# Patient Record
Sex: FEMALE | Race: ASIAN | NOT HISPANIC OR LATINO | Employment: FULL TIME | URBAN - METROPOLITAN AREA
[De-identification: names, ages, dates, MRNs, and addresses within clinical notes are randomized per-mention and may not be internally consistent; named-entity substitution may affect disease eponyms.]

---

## 2017-03-21 ENCOUNTER — COMMUNICATION - HEALTHEAST (OUTPATIENT)
Dept: FAMILY MEDICINE | Facility: CLINIC | Age: 52
End: 2017-03-21

## 2017-03-24 ENCOUNTER — OFFICE VISIT - HEALTHEAST (OUTPATIENT)
Dept: FAMILY MEDICINE | Facility: CLINIC | Age: 52
End: 2017-03-24

## 2017-03-24 ENCOUNTER — RECORDS - HEALTHEAST (OUTPATIENT)
Dept: MAMMOGRAPHY | Facility: CLINIC | Age: 52
End: 2017-03-24

## 2017-03-24 DIAGNOSIS — Z12.31 ENCOUNTER FOR SCREENING MAMMOGRAM FOR MALIGNANT NEOPLASM OF BREAST: ICD-10-CM

## 2017-03-24 DIAGNOSIS — R07.9 CHEST PAIN, UNSPECIFIED TYPE: ICD-10-CM

## 2017-03-24 DIAGNOSIS — I10 ESSENTIAL HYPERTENSION: ICD-10-CM

## 2017-03-24 LAB
ATRIAL RATE - MUSE: 82 BPM
CHOLEST SERPL-MCNC: 198 MG/DL
DIASTOLIC BLOOD PRESSURE - MUSE: NORMAL MMHG
FASTING STATUS PATIENT QL REPORTED: YES
HDLC SERPL-MCNC: 54 MG/DL
INTERPRETATION ECG - MUSE: NORMAL
LDLC SERPL CALC-MCNC: 124 MG/DL
P AXIS - MUSE: 32 DEGREES
PR INTERVAL - MUSE: 198 MS
QRS DURATION - MUSE: 76 MS
QT - MUSE: 424 MS
QTC - MUSE: 495 MS
R AXIS - MUSE: -12 DEGREES
SYSTOLIC BLOOD PRESSURE - MUSE: NORMAL MMHG
T AXIS - MUSE: 27 DEGREES
TRIGL SERPL-MCNC: 99 MG/DL
VENTRICULAR RATE- MUSE: 82 BPM

## 2017-03-31 ENCOUNTER — OFFICE VISIT - HEALTHEAST (OUTPATIENT)
Dept: FAMILY MEDICINE | Facility: CLINIC | Age: 52
End: 2017-03-31

## 2017-03-31 DIAGNOSIS — J02.0 STREP PHARYNGITIS: ICD-10-CM

## 2017-03-31 DIAGNOSIS — R50.9 FEVER: ICD-10-CM

## 2017-03-31 DIAGNOSIS — R11.2 NAUSEA & VOMITING: ICD-10-CM

## 2017-04-11 ENCOUNTER — HOSPITAL ENCOUNTER (OUTPATIENT)
Dept: MAMMOGRAPHY | Facility: HOSPITAL | Age: 52
Discharge: HOME | End: 2017-04-11
Attending: FAMILY MEDICINE

## 2017-04-11 DIAGNOSIS — N64.89 BREAST ASYMMETRY: ICD-10-CM

## 2017-11-07 ENCOUNTER — COMMUNICATION - HEALTHEAST (OUTPATIENT)
Dept: FAMILY MEDICINE | Facility: CLINIC | Age: 52
End: 2017-11-07

## 2018-09-11 ENCOUNTER — AMBULATORY - HEALTHEAST (OUTPATIENT)
Dept: NURSING | Facility: CLINIC | Age: 53
End: 2018-09-11

## 2018-09-11 DIAGNOSIS — Z23 NEED FOR INFLUENZA VACCINATION: ICD-10-CM

## 2018-10-03 ENCOUNTER — OFFICE VISIT - HEALTHEAST (OUTPATIENT)
Dept: FAMILY MEDICINE | Facility: CLINIC | Age: 53
End: 2018-10-03

## 2018-10-03 DIAGNOSIS — Z00.00 ROUTINE GENERAL MEDICAL EXAMINATION AT A HEALTH CARE FACILITY: ICD-10-CM

## 2018-10-03 DIAGNOSIS — I10 ESSENTIAL HYPERTENSION: ICD-10-CM

## 2018-10-03 ASSESSMENT — MIFFLIN-ST. JEOR: SCORE: 1018.93

## 2018-11-02 ENCOUNTER — OFFICE VISIT - HEALTHEAST (OUTPATIENT)
Dept: FAMILY MEDICINE | Facility: CLINIC | Age: 53
End: 2018-11-02

## 2018-11-02 ENCOUNTER — RECORDS - HEALTHEAST (OUTPATIENT)
Dept: MAMMOGRAPHY | Facility: CLINIC | Age: 53
End: 2018-11-02

## 2018-11-02 ENCOUNTER — COMMUNICATION - HEALTHEAST (OUTPATIENT)
Dept: FAMILY MEDICINE | Facility: CLINIC | Age: 53
End: 2018-11-02

## 2018-11-02 DIAGNOSIS — Z12.31 ENCOUNTER FOR SCREENING MAMMOGRAM FOR BREAST CANCER: ICD-10-CM

## 2018-11-02 DIAGNOSIS — I10 ESSENTIAL HYPERTENSION: ICD-10-CM

## 2018-11-02 DIAGNOSIS — Z12.11 SCREENING FOR COLON CANCER: ICD-10-CM

## 2018-11-02 DIAGNOSIS — Z12.31 ENCOUNTER FOR SCREENING MAMMOGRAM FOR MALIGNANT NEOPLASM OF BREAST: ICD-10-CM

## 2018-11-02 DIAGNOSIS — Z12.11 SCREEN FOR COLON CANCER: ICD-10-CM

## 2018-11-02 LAB
ALBUMIN SERPL-MCNC: 3.9 G/DL (ref 3.5–5)
ALP SERPL-CCNC: 96 U/L (ref 45–120)
ALT SERPL W P-5'-P-CCNC: 20 U/L (ref 0–45)
ANION GAP SERPL CALCULATED.3IONS-SCNC: 12 MMOL/L (ref 5–18)
AST SERPL W P-5'-P-CCNC: 22 U/L (ref 0–40)
BILIRUB SERPL-MCNC: 0.5 MG/DL (ref 0–1)
BUN SERPL-MCNC: 19 MG/DL (ref 8–22)
CALCIUM SERPL-MCNC: 9.7 MG/DL (ref 8.5–10.5)
CHLORIDE BLD-SCNC: 102 MMOL/L (ref 98–107)
CHOLEST SERPL-MCNC: 193 MG/DL
CO2 SERPL-SCNC: 28 MMOL/L (ref 22–31)
CREAT SERPL-MCNC: 0.72 MG/DL (ref 0.6–1.1)
FASTING STATUS PATIENT QL REPORTED: NO
GFR SERPL CREATININE-BSD FRML MDRD: >60 ML/MIN/1.73M2
GLUCOSE BLD-MCNC: 118 MG/DL (ref 70–125)
HDLC SERPL-MCNC: 50 MG/DL
LDLC SERPL CALC-MCNC: 124 MG/DL
POTASSIUM BLD-SCNC: 3.6 MMOL/L (ref 3.5–5)
PROT SERPL-MCNC: 7.9 G/DL (ref 6–8)
SODIUM SERPL-SCNC: 142 MMOL/L (ref 136–145)
TRIGL SERPL-MCNC: 95 MG/DL

## 2018-11-02 ASSESSMENT — MIFFLIN-ST. JEOR: SCORE: 1009.51

## 2018-11-19 ENCOUNTER — RECORDS - HEALTHEAST (OUTPATIENT)
Dept: ADMINISTRATIVE | Facility: OTHER | Age: 53
End: 2018-11-19

## 2018-12-03 ENCOUNTER — RECORDS - HEALTHEAST (OUTPATIENT)
Dept: ADMINISTRATIVE | Facility: OTHER | Age: 53
End: 2018-12-03

## 2018-12-03 LAB — COLOGUARD-ABSTRACT: NEGATIVE

## 2019-03-11 ENCOUNTER — OFFICE VISIT - HEALTHEAST (OUTPATIENT)
Dept: FAMILY MEDICINE | Facility: CLINIC | Age: 54
End: 2019-03-11

## 2019-03-11 DIAGNOSIS — I10 ESSENTIAL HYPERTENSION: ICD-10-CM

## 2019-03-11 ASSESSMENT — MIFFLIN-ST. JEOR: SCORE: 1031.65

## 2019-03-28 ENCOUNTER — OFFICE VISIT - HEALTHEAST (OUTPATIENT)
Dept: FAMILY MEDICINE | Facility: CLINIC | Age: 54
End: 2019-03-28

## 2019-03-28 DIAGNOSIS — I10 ESSENTIAL HYPERTENSION: ICD-10-CM

## 2019-03-28 LAB
ANION GAP SERPL CALCULATED.3IONS-SCNC: 12 MMOL/L (ref 5–18)
BUN SERPL-MCNC: 20 MG/DL (ref 8–22)
CALCIUM SERPL-MCNC: 9.7 MG/DL (ref 8.5–10.5)
CHLORIDE BLD-SCNC: 102 MMOL/L (ref 98–107)
CO2 SERPL-SCNC: 27 MMOL/L (ref 22–31)
CREAT SERPL-MCNC: 0.73 MG/DL (ref 0.6–1.1)
GFR SERPL CREATININE-BSD FRML MDRD: >60 ML/MIN/1.73M2
GLUCOSE BLD-MCNC: 120 MG/DL (ref 70–125)
POTASSIUM BLD-SCNC: 3.3 MMOL/L (ref 3.5–5)
SODIUM SERPL-SCNC: 141 MMOL/L (ref 136–145)

## 2019-03-28 ASSESSMENT — MIFFLIN-ST. JEOR: SCORE: 997.8

## 2019-03-29 ENCOUNTER — AMBULATORY - HEALTHEAST (OUTPATIENT)
Dept: FAMILY MEDICINE | Facility: CLINIC | Age: 54
End: 2019-03-29

## 2019-03-29 DIAGNOSIS — E87.6 HYPOKALEMIA: ICD-10-CM

## 2019-04-01 ENCOUNTER — COMMUNICATION - HEALTHEAST (OUTPATIENT)
Dept: FAMILY MEDICINE | Facility: CLINIC | Age: 54
End: 2019-04-01

## 2019-04-23 ENCOUNTER — AMBULATORY - HEALTHEAST (OUTPATIENT)
Dept: LAB | Facility: CLINIC | Age: 54
End: 2019-04-23

## 2019-04-23 DIAGNOSIS — E87.6 HYPOKALEMIA: ICD-10-CM

## 2019-04-23 LAB — POTASSIUM BLD-SCNC: 3.7 MMOL/L (ref 3.5–5)

## 2019-04-26 ENCOUNTER — COMMUNICATION - HEALTHEAST (OUTPATIENT)
Dept: FAMILY MEDICINE | Facility: CLINIC | Age: 54
End: 2019-04-26

## 2019-05-13 ENCOUNTER — COMMUNICATION - HEALTHEAST (OUTPATIENT)
Dept: FAMILY MEDICINE | Facility: CLINIC | Age: 54
End: 2019-05-13

## 2019-05-20 ENCOUNTER — RECORDS - HEALTHEAST (OUTPATIENT)
Dept: HEALTH INFORMATION MANAGEMENT | Facility: CLINIC | Age: 54
End: 2019-05-20

## 2019-06-28 ENCOUNTER — OFFICE VISIT - HEALTHEAST (OUTPATIENT)
Dept: FAMILY MEDICINE | Facility: CLINIC | Age: 54
End: 2019-06-28

## 2019-06-28 DIAGNOSIS — I10 ESSENTIAL HYPERTENSION: ICD-10-CM

## 2019-06-28 ASSESSMENT — MIFFLIN-ST. JEOR: SCORE: 1000.07

## 2019-08-23 ENCOUNTER — OFFICE VISIT - HEALTHEAST (OUTPATIENT)
Dept: FAMILY MEDICINE | Facility: CLINIC | Age: 54
End: 2019-08-23

## 2019-08-23 DIAGNOSIS — I10 ESSENTIAL HYPERTENSION: ICD-10-CM

## 2019-08-23 ASSESSMENT — MIFFLIN-ST. JEOR: SCORE: 997.62

## 2019-11-22 ENCOUNTER — OFFICE VISIT - HEALTHEAST (OUTPATIENT)
Dept: FAMILY MEDICINE | Facility: CLINIC | Age: 54
End: 2019-11-22

## 2019-11-25 ENCOUNTER — COMMUNICATION - HEALTHEAST (OUTPATIENT)
Dept: FAMILY MEDICINE | Facility: CLINIC | Age: 54
End: 2019-11-25

## 2019-12-11 ENCOUNTER — COMMUNICATION - HEALTHEAST (OUTPATIENT)
Dept: FAMILY MEDICINE | Facility: CLINIC | Age: 54
End: 2019-12-11

## 2019-12-19 ENCOUNTER — COMMUNICATION - HEALTHEAST (OUTPATIENT)
Dept: FAMILY MEDICINE | Facility: CLINIC | Age: 54
End: 2019-12-19

## 2020-07-15 ENCOUNTER — COMMUNICATION - HEALTHEAST (OUTPATIENT)
Dept: FAMILY MEDICINE | Facility: CLINIC | Age: 55
End: 2020-07-15

## 2020-09-05 ENCOUNTER — COMMUNICATION - HEALTHEAST (OUTPATIENT)
Dept: FAMILY MEDICINE | Facility: CLINIC | Age: 55
End: 2020-09-05

## 2020-09-05 DIAGNOSIS — I10 ESSENTIAL HYPERTENSION: ICD-10-CM

## 2020-09-09 RX ORDER — LOSARTAN POTASSIUM 25 MG/1
TABLET ORAL
Qty: 90 TABLET | Refills: 3 | Status: SHIPPED | OUTPATIENT
Start: 2020-09-09

## 2021-03-16 ENCOUNTER — COMMUNICATION - HEALTHEAST (OUTPATIENT)
Dept: FAMILY MEDICINE | Facility: CLINIC | Age: 56
End: 2021-03-16

## 2021-04-29 ENCOUNTER — COMMUNICATION - HEALTHEAST (OUTPATIENT)
Dept: FAMILY MEDICINE | Facility: CLINIC | Age: 56
End: 2021-04-29

## 2021-05-03 ENCOUNTER — COMMUNICATION - HEALTHEAST (OUTPATIENT)
Dept: FAMILY MEDICINE | Facility: CLINIC | Age: 56
End: 2021-05-03

## 2021-05-13 ENCOUNTER — COMMUNICATION - HEALTHEAST (OUTPATIENT)
Dept: FAMILY MEDICINE | Facility: CLINIC | Age: 56
End: 2021-05-13

## 2021-05-27 NOTE — TELEPHONE ENCOUNTER
----- Message from Mandy Corrigan MD sent at 3/29/2019  5:54 PM CDT -----  Please call pt with an  and let them know that their lab results show that her potassium is a little bit low.  I recommend that she come in for a lab only in 3 weeks to recheck.

## 2021-05-27 NOTE — TELEPHONE ENCOUNTER
CMT left message x 1. Please review message thread below and advise the patient as indicated. Please schedule if necessary or indicated in message thread.  CMT will attempt to reach the patient x 3 per clinical protocol before sending a letter to prompt patient follow up.

## 2021-05-27 NOTE — PROGRESS NOTES
"S:  55 yo female who is here in follow up of her hypertension.    She initialy had some fatigue with her meds, but she switched to taking them at night, and this is ok for her.  She did take them last night.  She denies any cp, sobr.    She does sleep through the night.  She is not waking up mutiple times at night to use the bathroom due to this medication.    No lower extremity edema.  No n/t/w in any one part of her body.  No significant headaches.     She has never had a pap smear.  She denies any history of sexual contact.    She will think about if she wants to do one of these.    O:  /80 (Patient Site: Left Arm, Patient Position: Sitting, Cuff Size: Adult Regular)   Pulse 93   Temp 97.4  F (36.3  C) (Oral)   Ht 4' 7.12\" (1.4 m)   Wt 126 lb 8 oz (57.4 kg)   SpO2 95%   BMI 29.28 kg/m    Gen: no acute distress  Neck:  Supple, no lad, no carotid bruits  Heart:  Regular rate and rhythm.  No m/r/g  Lungs: cta bilaterally, no wheezes or rhonchi.  Good air inspiration  Abdomen:  No masses or organomegaly, soft.  Obese.  Non tender . Non distended.    Extremities:  No edema.           Patient Active Problem List   Diagnosis     Essential hypertension     Current Outpatient Medications on File Prior to Visit   Medication Sig Dispense Refill     albuterol (PROVENTIL HFA;VENTOLIN HFA) 90 mcg/actuation inhaler Inhale 2 puffs every 6 (six) hours as needed for wheezing. 18 g 11     hydroCHLOROthiazide (HYDRODIURIL) 25 MG tablet Take 2 tablets (50 mg total) by mouth daily. 120 tablet 12     inhalational spacing device (AEROCHAMBER MV) Spcr Use with albuterol inhaler 1 each 0     No current facility-administered medications on file prior to visit.           No results found for this or any previous visit (from the past 48 hour(s)).      Assessment/Plan:  1. Essential hypertension  Continue with current meds.    Encouraged daily cardiac activity in addition to her walking that she does at work.    Reviewed signs of " stroke and MI.    - Basic Metabolic Panel      We reviewed pap smear screening.  She will think about this, and consider getting one at her next visit.      The entire conversation today was conducted through the use of a professional .      Mandy Corrigan   3/28/2019 9:38 AM

## 2021-05-27 NOTE — TELEPHONE ENCOUNTER
Patient notified per provider result below. The patient verbalizes understanding of provider/CSS instructions for follow-up and continued care per provider message.     Patient scheduled for potassium check per MD request 04/23/19 at 08:00 AM.

## 2021-05-28 NOTE — TELEPHONE ENCOUNTER
----- Message from Mandy Corrigan MD sent at 4/25/2019  4:45 PM CDT -----  Please call pt with an  and let them know that their lab results show that her potassium is now normal.  We do not need to do any supplementation at this time.

## 2021-05-28 NOTE — TELEPHONE ENCOUNTER
CMT attempted to reach the patient x 2. CMT unable to leave voicemail, unable to reach the patient. Please review message thread below and advise the patient as indicated. Please schedule if necessary or indicated in message thread.  CMT will attempt to reach the patient x 3 per clinical protocol before sending a letter to prompt patient follow up.

## 2021-05-28 NOTE — TELEPHONE ENCOUNTER
[facility] was able to reach the patient's brother Rosendo ( SHARON on file). Her phone is not currently in service. Brother will pass on result to patient that potassium normal, no supplement needed at this time.

## 2021-05-28 NOTE — TELEPHONE ENCOUNTER
Attempted to call patient.  Unable to leave message, letter sent.  If patient calls back please schedule for papsmear.

## 2021-05-30 VITALS — BODY MASS INDEX: 27.98 KG/M2 | WEIGHT: 127.04 LBS

## 2021-05-30 VITALS — WEIGHT: 122.31 LBS | BODY MASS INDEX: 26.94 KG/M2

## 2021-05-30 NOTE — PROGRESS NOTES
"S:  Jenny Villarreal is a 54 y.o. female who comes to the clinic today for  1.  Hypertension:  She is doing ok.  She has been out of her medications x 1 week.  When she was taking them ,she denied any problems with them, except feeling like her fingers are numb when she is taking her blood pressure medication.  When she stops the medication, her fingers return to normal .   .  She denies any cp, sobr, n/t/w in any one part of her body.  She has not had time to  her medications.  She does drive.  No vision changes.  No headaches.        2.  Hcm:  She declines a pap smear today . We have discussed this in the past.  She has no history of sexual contact.      I reviewed the pertinent family, social, surgical, medical history.      O:  BP (!) 154/106   Pulse 92   Temp 99.1  F (37.3  C) (Oral)   Resp 16   Ht 4' 7.12\" (1.4 m)   Wt 127 lb (57.6 kg)   SpO2 94%   BMI 29.39 kg/m    Gen: no acute distress  Neck:  Supple, no lad, no carotid bruits  Heart:  Regular rate and rhythm.  No m/r/g  Lungs: cta bilaterally, no wheezes or rhonchi.  Good air inspiration  Abdomen:  No masses or organomegaly  Extremities:  No edema.   Sensation intact over bilateral fingers.  Hand strength is normal.    Peripheral pulses are normal .         Patient Active Problem List   Diagnosis     Essential hypertension     Current Outpatient Medications on File Prior to Visit   Medication Sig Dispense Refill     albuterol (PROVENTIL HFA;VENTOLIN HFA) 90 mcg/actuation inhaler Inhale 2 puffs every 6 (six) hours as needed for wheezing. 18 g 11     hydroCHLOROthiazide (HYDRODIURIL) 25 MG tablet Take 2 tablets (50 mg total) by mouth daily. 120 tablet 12     inhalational spacing device (AEROCHAMBER MV) Spcr Use with albuterol inhaler 1 each 0     No current facility-administered medications on file prior to visit.           No results found for this or any previous visit (from the past 48 hour(s)).     No images are attached to the encounter or orders " placed in the encounter.       Assessment/Plan:  1. Essential hypertension  We will discontinue her HCTZ.  Start lisinopril instead.  Recheck blood pressure in 2 weeks to make sure that this is a good dose.  We did discuss the side effects of lisinopril including but not limited to cough.  We also discussed the importance of maintaining hydration when using lisinopril to avoid renal damage.  - lisinopril (PRINIVIL,ZESTRIL) 20 MG tablet; Take 1 tablet (20 mg total) by mouth daily.  Dispense: 90 tablet; Refill: 3          Mandy Corrigan   6/28/2019 9:23 AM

## 2021-05-31 NOTE — PROGRESS NOTES
"S:  Jenny Villarreal is a 54 y.o. female who comes to the clinic today for  1.  Hypertension:  She started her lisinopril, but developed a dry cough.  When she stopped the mediation, her cough disappeared.  No cp or sobr.  She stopped her medication 1 week ago.  No lower extremity edema.  She coughed so hard she had a headache and some cp.  This is slowly getting better.      I reviewed the pertinent family, social, surgical, medical history.    Pt declines to do her pap smear today.  She feels like she doesn't have \"anything\".    She has never had a history of sexual contact.          O:  BP (!) 142/94   Pulse 72   Temp 97.8  F (36.6  C) (Oral)   Resp 16   Ht 4' 7.25\" (1.403 m)   Wt 126 lb (57.2 kg)   BMI 29.02 kg/m    Gen: no acute distress  Neck:  Supple, no lad, no carotid bruits  Heart:  Regular rate and rhythm.  No m/r/g  Lungs: cta bilaterally, no wheezes or rhonchi.  Good air inspiration  Abdomen:  No masses or organomegaly  Extremities:  No edema.         Patient Active Problem List   Diagnosis     Essential hypertension     Current Outpatient Medications on File Prior to Visit   Medication Sig Dispense Refill     albuterol (PROVENTIL HFA;VENTOLIN HFA) 90 mcg/actuation inhaler Inhale 2 puffs every 6 (six) hours as needed for wheezing. 18 g 11     inhalational spacing device (AEROCHAMBER MV) Spcr Use with albuterol inhaler 1 each 0     lisinopril (PRINIVIL,ZESTRIL) 20 MG tablet Take 1 tablet (20 mg total) by mouth daily. 90 tablet 3     No current facility-administered medications on file prior to visit.           No results found for this or any previous visit (from the past 48 hour(s)).     No images are attached to the encounter or orders placed in the encounter.       Assessment/Plan:  1. Essential hypertension  Reviewed use of losartan.  Reviewed risks and benefits.    Will recheck bp in 3 months.    If any worsening in cp, or any sobr, then to the ER.    - losartan (COZAAR) 25 MG tablet; Take 1 tablet " (25 mg total) by mouth daily.  Dispense: 90 tablet; Refill: 3      2.  Screening for cervical cancer  Pt is low risk due to no history of sexual contact. reivewed with her today that her risk is not absent.          Mandy Corrigan   8/23/2019 9:35 AM

## 2021-06-02 VITALS — WEIGHT: 126.75 LBS | BODY MASS INDEX: 28.51 KG/M2 | HEIGHT: 56 IN

## 2021-06-02 VITALS — WEIGHT: 125.12 LBS | BODY MASS INDEX: 28.14 KG/M2 | HEIGHT: 56 IN

## 2021-06-02 VITALS — HEIGHT: 56 IN | BODY MASS INDEX: 29.25 KG/M2 | WEIGHT: 130 LBS

## 2021-06-02 VITALS — BODY MASS INDEX: 29.28 KG/M2 | HEIGHT: 55 IN | WEIGHT: 126.5 LBS

## 2021-06-03 VITALS — HEIGHT: 55 IN | BODY MASS INDEX: 29.39 KG/M2 | WEIGHT: 127 LBS

## 2021-06-03 VITALS — WEIGHT: 126 LBS | HEIGHT: 55 IN | BODY MASS INDEX: 29.16 KG/M2

## 2021-06-04 NOTE — TELEPHONE ENCOUNTER
Called to reschedule no show BP appt with Dr. Corrigan on 11/22/2019 no answer no vm . Please assist in rescheduling when patient calls back.

## 2021-06-05 NOTE — TELEPHONE ENCOUNTER
See message string.  Called via  ID [phone] to inquire if patient received letter and is able to schedule a HTN follow up with Provider.    Unable to reach patient or leave message.

## 2021-06-09 NOTE — PROGRESS NOTES
Chief Complaint   Patient presents with     Chest Pain     since January 15         HPI:   Jenny Villarreal is a 52 y.o. female with  c/o chest pain for the last two months.  Started after she was lifting something heavy. States that until two days ago the pain was constant but now is only intermittent.  In the last two days it only occurs when she lifts something.   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  Hasn't missed work due to the pain.  Gets some shortness of breath doing the lifting  Denies diaphoresis with the chest pain.  No medication taken.  Hasn't been evaluated for the pain.    Age: 52  Gender: female  Smoking: none  Hypertension: no medication, but it has been prescribed  Lipids: 2015--205/[id number]  Diabetes: no  Kidney Disease: no  Alcohol: none  Obesity: no  Exercise: work  Family History: none       ROS:  Constitutional: No fatigue, weakness, weight loss or gain, fevers, night sweats   Eyes:  no changes in visual acuity, diplopia or amaurosis, no discharge, matting, redness, tearing or eye pain.    ENT: no hearing loss, ear pain, tinnitus or aural discharge. no sore throat, dental pain, hoarseness, dysphagia, oral or tongue lesions.    no nasal stuffiness, discharge, coryza or bleeding. No sinus pain.    Respiratory: No cough,, wheezing or hemoptysis.      CV: No orthopnea, PND,   GI: no abdominal or flank pain, anorexia, nausea or vomiting, dysphagia, change in bowel habits or black or bloody stools or weight loss.   : no dysuria, no hematuria  SKIN: no rash  MS: No muscular pain, tenderness, redness, stiffness, swelling or pain in joints of upper or lower extremities, restriction in movement in extremities, injuries, back pain.   No leg swelling.  NEURO: No symptoms of neurological impairment or TIA's; no amaurosis, diplopia, dysphasia, or unilateral disturbance of motor or sensory function. No loss of balance or vertigo.   ENDO: No heat or cold  intolerance.  No polyuria, polyphagia, polydipsia   HEME/LYMPH: No abnormal bruising or abnormal bleeding. No node swelling.      Medications:  Current Outpatient Prescriptions on File Prior to Visit   Medication Sig Dispense Refill     albuterol (PROVENTIL HFA;VENTOLIN HFA) 90 mcg/actuation inhaler Inhale 2 puffs every 6 (six) hours as needed for wheezing. 18 g 11     hydrochlorothiazide (HYDRODIURIL) 25 MG tablet Take 1 tablet (25 mg total) by mouth daily. 30 tablet 12     inhalational spacing device (AEROCHAMBER MV) Spcr Use with albuterol inhaler 1 each 0     No current facility-administered medications on file prior to visit.          Social History:  Social History   Substance Use Topics     Smoking status: Never Smoker     Smokeless tobacco: Never Used     Alcohol use No         Physical Exam:   Vitals:    03/24/17 0918 03/24/17 0921   BP: (!) 154/100 (!) 170/100   Pulse: 94    Resp: 14    Temp: 98.3  F (36.8  C)    TempSrc: Oral    SpO2: 98%    Weight: 127 lb 0.6 oz (57.6 kg)        GENERAL:   Alert. Oriented.  EYES: Clear  HENT:  Ears: R TM pearly gray. Normal landmarks. L TM pearly gray.  Normal landmarks  Nose: Clear.  Sinuses: Nontender.  Oropharynx:  No erythema. No exudate.  NECK: no thyromegaly.  No adenopathy.  supple  LUNGS:  Clear to ascultation.  No crackles. Normal symmetric air entry throughout both lung fields. No chest wall deformities.   HEART:S1 and S2 normal, no murmurs, clicks, gallops or rubs. Regular rate and rhythm. . No JVD.   SKIN:  No rash.   ABDOMEN:  +BS. No tenderness. Soft, no guarding, rebound, rigidity,mass, or organomegaly. No CVA tenderness    MS:  Mild tenderness over upper sternal borders. No pedal edema  NEURO:  No weakness    EKG:  RAte:82;   Rhythm: sinus  No q waves.  No st elevation or depression.  No t wave inversions.  I have personally read the EKG.     XRAY:  CXR PA & LAT:   No cardiomegaly, infiltrate or effusion.  Normal chest. Personally reviewed film.       LABS:  Results for orders placed or performed in visit on 03/24/17   HM2(CBC w/o Differential)   Result Value Ref Range    WBC 8.2 4.0 - 11.0 thou/uL    RBC 5.34 3.80 - 5.40 mill/uL    Hemoglobin 15.0 12.0 - 16.0 g/dL    Hematocrit 45.4 35.0 - 47.0 %    MCV 85 80 - 100 fL    MCH 28.0 27.0 - 34.0 pg    MCHC 33.0 32.0 - 36.0 g/dL    RDW 12.3 11.0 - 14.5 %    Platelets 260 140 - 440 thou/uL    MPV 7.7 7.0 - 10.0 fL   Basic Metabolic Panel   Result Value Ref Range    Sodium 141 136 - 145 mmol/L    Potassium 4.1 3.5 - 5.0 mmol/L    Chloride 107 98 - 107 mmol/L    CO2 24 22 - 31 mmol/L    Anion Gap, Calculation 10 5 - 18 mmol/L    Glucose 115 70 - 125 mg/dL    Calcium 9.4 8.5 - 10.5 mg/dL    BUN 14 8 - 22 mg/dL    Creatinine 0.72 0.60 - 1.10 mg/dL    GFR MDRD Af Amer >60 >60 mL/min/1.73m2    GFR MDRD Non Af Amer >60 >60 mL/min/1.73m2   Lipid Cascade   Result Value Ref Range    Cholesterol 198 <=199 mg/dL    Triglycerides 99 <=149 mg/dL    HDL Cholesterol 54 >=50 mg/dL    LDL Calculated 124 <=129 mg/dL    Patient Fasting > 8hrs? Yes    Electrocardiogram Perform - Clinic   Result Value Ref Range    SYSTOLIC BLOOD PRESSURE  mmHg    DIASTOLIC BLOOD PRESSURE  mmHg    VENTRICULAR RATE 82 BPM    ATRIAL RATE 82 BPM    P-R INTERVAL 198 ms    QRS DURATION 76 ms    Q-T INTERVAL 424 ms    QTC CALCULATION (BEZET) 495 ms    P Axis 32 degrees    R AXIS -12 degrees    T AXIS 27 degrees    MUSE DIAGNOSIS       Normal sinus rhythm  Minimal voltage criteria for LVH, may be normal variant  Borderline ECG  No previous ECGs available  Confirmed by ISAIAH RAZA, SAMI LOC:[facility] ([id number]) on 3/24/2017 3:56:20 PM          Assessment/Plan:    1. Chest pain, unspecified type  HM2(CBC w/o Differential)    Basic Metabolic Panel    Electrocardiogram Perform - Clinic    XR Chest PA and Lateral    Lipid Cascade    Stress Test Graded   2. Essential hypertension  hydroCHLOROthiazide (HYDRODIURIL) 25 MG tablet        Chest pain associated with physical  lifting and with pain to palpation over anterior chest wall.  Likely muscle strain  EKG without acute signs of ischemia.  Labs all normal.  Does have HTN and has not been taking medication.  No other risk factors for CAD.  Advised heat to chest wall.  Will obtain stress test.  Discussed warning signs.  Recheck after stress test.      The following portions of the patient's history were reviewed and updated as appropriate: allergies, current medications, past family history, past medical history, past social history, past surgical history and problem list.    Paulie Curtis MD      3/24/2017

## 2021-06-09 NOTE — PROGRESS NOTES
ASSESMENT AND PLAN:  Diagnoses and all orders for this visit:    Fever  -     Rapid Strep A Screen-Throat  -     acetaminophen (TYLENOL) 500 MG tablet; Take 1 tablet (500 mg total) by mouth every 6 (six) hours as needed for pain or fever.  Dispense: 30 tablet; Refill: 0    Strep pharyngitis  -     amoxicillin (AMOXIL) 875 MG tablet; Take 1 tablet (875 mg total) by mouth 2 (two) times a day for 10 days.  Dispense: 20 tablet; Refill: 0    Nausea & vomiting  -     ondansetron (ZOFRAN) 4 MG tablet; Take 1 tablet (4 mg total) by mouth every 8 (eight) hours as needed for nausea.  Dispense: 10 tablet; Refill: 0    She will call if new problems arise.    SUBJECTIVE: Jenny Villarreal is here with fever, cough, nausea, vomiting and fatigue since yesterday.  He did not take temperature at home.  She vomited 3 times yesterday, none today.  She has some sore throat and mild headache.  No shortness of breath or wheezing.  No known sick contacts.  No diarrhea or abdominal pain.    Past Medical History:   Diagnosis Date     Fibrocystic breast      Menopause 2013     There is no problem list on file for this patient.      Allergies:  No Known Allergies    History   Smoking Status     Never Smoker   Smokeless Tobacco     Never Used       Review of systems otherwise negative except as listed in HPI.   History   Smoking Status     Never Smoker   Smokeless Tobacco     Never Used       OBJECTICE: /84 (Patient Site: Right Arm, Patient Position: Sitting, Cuff Size: Adult Regular)  Pulse 100  Temp 98.4  F (36.9  C) (Oral)   Resp 28  Wt 122 lb 5 oz (55.5 kg) Comment: w/shoes  BMI 26.94 kg/m2    GEN-alert,  in no apparent distress.  HEENT-mucous membranes are moist, neck is supple.  CV-regular rate and rhythm with no murmur.   RESP-lungs clear to auscultation .  ABDOMEN- Soft , not tender.  SKIN-no rashes.        Amor Gooden   3/31/2017

## 2021-06-09 NOTE — TELEPHONE ENCOUNTER
Per Provider 7/6/20, calling to schedule a virtual/video visit for patient in my virtual weeks that are coming up for a bp check . If they do not have a bp cuff at home to check their blood pressure ahead of time, please let me know so I can send an rx for one to their pharmacy to check their blood pressure once a day for the 1 week preceeding their visit . Thank you.     Unable to leave message; no answer, no voice mail.  Patient has no future appointments.

## 2021-06-10 NOTE — TELEPHONE ENCOUNTER
Patient overdue for HTN follow up.  See Provider message below re: BP cuff.    No answer. Unable to leave voice message.

## 2021-06-11 NOTE — TELEPHONE ENCOUNTER
RN cannot approve Refill Request    RN can NOT refill this medication PCP messaged that patient is overdue for Labs. Last office visit: 11/22/2019 Mandy Corrigan MD Last Physical: Visit date not found Last MTM visit: Visit date not found Last visit same specialty: 11/22/2019 Mandy Corrigan MD.  Next visit within 3 mo: Visit date not found  Next physical within 3 mo: Visit date not found      Dilcia Cronin, [facility] Triage/Med Refill 9/7/2020    Requested Prescriptions   Pending Prescriptions Disp Refills     losartan (COZAAR) 25 MG tablet [Pharmacy Med Name: LOSARTAN POTASSIUM 25 MG TAB] 90 tablet 3     Sig: TAKE 1 TABLET BY MOUTH EVERY DAY       Angiotensin Receptor Blocker Protocol Failed - 9/5/2020  9:51 AM        Failed - PCP or prescribing provider visit in past 12 months       Last office visit with prescriber/PCP: 11/22/2019 Mandy Corrigan MD OR same dept: 11/22/2019 Mandy Corrigan MD OR same specialty: 11/22/2019 Mandy Corrigan MD  Last physical: Visit date not found Last MTM visit: Visit date not found   Next visit within 3 mo: Visit date not found  Next physical within 3 mo: Visit date not found  Prescriber OR PCP: Mandy Corrigan MD  Last diagnosis associated with med order: 1. Essential hypertension  - losartan (COZAAR) 25 MG tablet [Pharmacy Med Name: LOSARTAN POTASSIUM 25 MG TAB]; TAKE 1 TABLET BY MOUTH EVERY DAY  Dispense: 90 tablet; Refill: 3    If protocol passes may refill for 12 months if within 3 months of last provider visit (or a total of 15 months).             Failed - Serum potassium within the past 12 months     No results found for: LN-POTASSIUM          Failed - Blood pressure filed in past 12 months     BP Readings from Last 1 Encounters:   08/23/19 (!) 142/94             Failed - Serum creatinine within the past 12 months     Creatinine   Date Value Ref Range Status   03/28/2019 0.73 0.60 - 1.10 mg/dL Final

## 2021-06-16 PROBLEM — I10 ESSENTIAL HYPERTENSION: Status: ACTIVE | Noted: 2018-11-02

## 2021-06-16 NOTE — TELEPHONE ENCOUNTER
Called and patient voicemail is not setup. Unable to leave msg.     Upon return call please schedule patient for htn f/u.

## 2021-06-17 NOTE — TELEPHONE ENCOUNTER
Unable to reach the patient after multiple attempts over the last few days.      UPON RETURN CALLED PLEASE HELP SCHEDULE BP CHECK PER QUALITY.

## 2021-06-17 NOTE — TELEPHONE ENCOUNTER
Jenny Villarreal      As a valued [facility] patient, your healthcare needs are our priority. Your health care team has determined that you are due for an appointment regarding your blood pressure check.     To help prevent delays in your case, please call the [facility] at [phone].    We look forward to partnering with you to achieve optimal health and wellbeing.     Sincerely,   Your care team at [facility]      Unable to reach the patient after multiple attempts over the last few days.  Printed letter and placed in the mail to be sent.

## 2021-06-19 NOTE — LETTER
Letter by Mandy Corrigan MD at      Author: Mandy Corrigan MD Service: -- Author Type: --    Filed:  Encounter Date: 5/13/2019 Status: (Other)         Jenny Villarreal  [address] [place] [address]                     May 13, 2019    Dear Jenny:    At the [facility], we care about you and your health. When diagnosed early, many diseases and illness can be treated and possibly prevented. That's why it is important to see your primary care provider regularly for routine examinations and to maintain your overall health.We are trying to contact you to ensure you receive the best level of care. Our records show that you are overdue for Pap Smear.  It is important we see you in the clinic soon, as an interruption in your care may result in delays for future medication/supply refills. Please contact our office at [phone] to schedule an appointment.  If you are receiving care elsewhere, please contact us so that we can update our records.   On behalf of [facility], thank you for trusting us with your care.       If you have any questions or concerns, please don't hesitate to call.    Sincerely,  [facility] Staff      Electronically signed by Mandy Corrigan MD

## 2021-06-20 NOTE — PROGRESS NOTES
Assessment:      Healthy female exam.      1. Routine general medical examination at a health care facility     2. Essential hypertension  hydroCHLOROthiazide (HYDRODIURIL) 25 MG tablet          Plan:       Resume HCTZ.      Pt plans to schedule mammogram.    She agrees to have colonoscopy.  BP needs to be controlled before the procedure can be done.    She declines labs today.    Recheck in 3 weeks, with Dr. Corrigan.    Work preventive care form completed and faxed.    Patient was seen with professional [facility] , Nelly Boateng.    This was a 52 minute visit.      Subjective:      Jenny Villarreal is a 53 y.o. female who presents for an annual exam. The patient is not sexually active. The patient participates in regular exercise: yes. The patient reports that there is not domestic violence in her life.     She had previously taken HCTZ, after lisinopril caused a cough.      Healthy Habits:   Regular Exercise: Yes  Sunscreen Use: Yes  Healthy Diet: Yes  Dental Visits Regularly: No  Seat Belt: Yes  Sexually active: No  Self Breast Exam Monthly:Yes  Hemoccults: No  Flex Sig: No  Colonoscopy: No  Lipid Profile: Yes  Glucose Screen: Yes  Prevention of Osteoporosis: No  Last Dexa: N/A  Guns at Home:  No      Immunization History   Administered Date(s) Administered     Influenza, seasonal,quad inj 36+ mos 10/13/2015     Influenza,seasonal quad, PF, 36+MOS 2018     Tdap 10/13/2015     Immunization status: up to date and documented, UTD..    No exam data present    Gynecologic History  No LMP recorded. Patient is postmenopausal.  Contraception: abstinence  Last Pap: . Results were:   Last mammogram: 2017. Results were: normal      OB History    Para Term  AB Living   0 0 0 0 0 0   SAB TAB Ectopic Multiple Live Births   0 0 0 0              Current Outpatient Prescriptions   Medication Sig Dispense Refill     albuterol (PROVENTIL HFA;VENTOLIN HFA) 90 mcg/actuation inhaler Inhale 2 puffs every 6 (six)  "hours as needed for wheezing. 18 g 11     hydroCHLOROthiazide (HYDRODIURIL) 25 MG tablet Take 1 tablet (25 mg total) by mouth daily. 30 tablet 12     inhalational spacing device (AEROCHAMBER MV) Spcr Use with albuterol inhaler 1 each 0     No current facility-administered medications for this visit.      Past Medical History:   Diagnosis Date     Fibrocystic breast      Menopause 2013     No past surgical history on file.  Review of patient's allergies indicates no known allergies.  Family History   Problem Relation Age of Onset     Breast cancer Neg Hx      Social History     Social History     Marital status: Single     Spouse name: N/A     Number of children: N/A     Years of education: N/A     Occupational History     Not on file.     Social History Main Topics     Smoking status: Never Smoker     Smokeless tobacco: Never Used     Alcohol use No     Drug use: No     Sexual activity: No     Other Topics Concern     Not on file     Social History Narrative       Review of Systems  Review of Systems   Constitutional: Negative.    HENT: Negative.    Eyes: Negative.    Respiratory: Negative.    Cardiovascular: Negative.    Gastrointestinal: Negative.    Genitourinary: Negative.              Objective:         Vitals:    10/03/18 0834 10/03/18 0843 10/03/18 0919   BP: (!) 186/108 (!) 190/112 (!) 172/98   Pulse: 93     Resp: 18     Temp: 98.2  F (36.8  C)     TempSrc: Oral     SpO2: 96%     Weight: 126 lb 12 oz (57.5 kg)     Height: 4' 8.38\" (1.432 m)       Body mass index is 28.04 kg/(m^2).    Physical  Physical Exam     Eyes: EOM full, pupils normal, conjunctivae normal  Ears: TM's and canals normal  Oropharynx: normal  Neck: supple without adenopathy or thyromegaly  Lungs: normal  Heart: regular rhythm, normal rate, no murmur  Abdomen: no HSM, mass or tenderness  Pt declined breast exam and pelvic exam  Extremities: FROM, normal strength and sensation    "

## 2021-06-20 NOTE — LETTER
Letter by Mandy Corrigan MD at      Author: Mandy Corrigan MD Service: -- Author Type: --    Filed:  Encounter Date: 12/19/2019 Status: Signed         Jenny Villarreal  [address] [place] [address]      December 19, 2019      Dear Jenny,    We hope this letter finds you doing well! As a valued [facility] patient, your healthcare needs are our priority! Your health care team has determined that you are due for an  Annual Physical Exam.     To help prevent delays in your care, please call the [facility] at [phone].    Happy Holidays to you and your family! We look forward to seeing you soon!      Sincerely,  Your care team at [facility]

## 2021-06-21 NOTE — PROGRESS NOTES
"S:  52 yo female who is here in follow up of her blood pressure.  She was seen for a physical several weeks ago, at which time she dwas noted to have high blood pressure.  She was off her hctz at that time and was restarted on her meds.  She comes in today doing well.  No cp.  No sobr.  No orthopnea or pnd.  She walks a lot at work.   She denies problems with her medications.    Hcm:  She is due for a pap smear, but has never been sexually active in her life.  She declines a pap smear.    She is due for a colon cancer screening.  She is under the impression that this is a camera down her throat.    A complete ROS was negative.  Including negative for blood in stool or blood in urine. No breast changes.          O:  /80 (Patient Site: Right Arm, Patient Position: Sitting, Cuff Size: Adult Large)  Pulse 97  Temp 98.3  F (36.8  C) (Oral)   Resp 10  Ht 4' 8.25\" (1.429 m)  Wt 125 lb 1.9 oz (56.8 kg)  SpO2 97%  Breastfeeding? No  BMI 27.8 kg/m2  Gen: no acute distress  Neck:  Supple, no lad, no carotid bruits  Heart:  Regular rate and rhythm.  No m/r/g  Lungs: cta bilaterally, no wheezes or rhonchi.  Good air inspiration  Abdomen:  No masses or organomegaly, soft.  Non tender.  Non distended.    Extremities:  No edema.           There is no problem list on file for this patient.    Current Outpatient Prescriptions on File Prior to Visit   Medication Sig Dispense Refill     hydroCHLOROthiazide (HYDRODIURIL) 25 MG tablet Take 1 tablet (25 mg total) by mouth daily. 30 tablet 12     albuterol (PROVENTIL HFA;VENTOLIN HFA) 90 mcg/actuation inhaler Inhale 2 puffs every 6 (six) hours as needed for wheezing. 18 g 11     inhalational spacing device (AEROCHAMBER MV) Spcr Use with albuterol inhaler 1 each 0     No current facility-administered medications on file prior to visit.           No results found for this or any previous visit (from the past 48 hour(s)).      Assessment/Plan:  1. Essential " hypertension  Controlled  Continue with current meds.    Recheck in 4 months or prn.    Continue with healthy diet and exercise.    Reviewed the signs of MI or stroke.    - Comprehensive Metabolic Panel  - Lipid Cascade  - hydroCHLOROthiazide (HYDRODIURIL) 25 MG tablet; Take 1 tablet (25 mg total) by mouth daily.  Dispense: 90 tablet; Refill: 12    2. Screening for colon cancer      3. Screen for colon cancer  Reviewed options today.  She elected to go with cologuard.    - Cologuard    4. Encounter for screening mammogram for breast cancer  mammo done today.      The entire conversation today was conducted through the use of a professional .        Mandy Corrigan   11/2/2018 8:53 AM

## 2021-06-21 NOTE — LETTER
Letter by Mandy Corrigan MD at      Author: Mandy Corrigan MD Service: -- Author Type: --    Filed:  Encounter Date: 4/29/2021 Status: (Other)                        Jenny Villarreal      As a valued [facility] patient, your healthcare needs are our priority. Your health care team has determined that you are due for an appointment regarding your blood pressure check.     To help prevent delays in your case, please call the [facility] at [phone].    We look forward to partnering with you to achieve optimal health and wellbeing.     Sincerely,   Your care team at [facility]

## 2021-06-24 NOTE — PROGRESS NOTES
"S:  55 yo female who is here for a bp check.  She is doing ok.    She has been taking her bp tablets, 1 per day, but then it was making her so drowsy that she was having a hard time going to work while taking it.    When she first took it in the am, it made her so tired that she was not able to get her work done.  She wonders why this is. She did not take her bp meds today.    She did switch her bp meds to taking them in the evening, and did take it last night.    She denies any chest pain, shortness of breath, headaches, vision changes, numbness, tingling, weakness in any part of her body.  She is otherwise doing well.  She denies any lower extremity edema.    O:  /90   Pulse 83   Temp 97.9  F (36.6  C) (Oral)   Resp 16   Ht 4' 8.25\" (1.429 m)   Wt 130 lb (59 kg)   SpO2 94%   BMI 28.89 kg/m    Gen: no acute distress  Neuro:  Grossly intact.    Neck:  Supple, no lad, no carotid bruits  Heart:  Regular rate and rhythm.  No m/r/g  Lungs: cta bilaterally, no wheezes or rhonchi.  Good air inspiration  Abdomen:  No masses or organomegaly, soft.  Non tender .   Extremities:  No edema.           Patient Active Problem List   Diagnosis     Essential hypertension     Current Outpatient Medications on File Prior to Visit   Medication Sig Dispense Refill     albuterol (PROVENTIL HFA;VENTOLIN HFA) 90 mcg/actuation inhaler Inhale 2 puffs every 6 (six) hours as needed for wheezing. 18 g 11     hydroCHLOROthiazide (HYDRODIURIL) 25 MG tablet Take 1 tablet (25 mg total) by mouth daily. 90 tablet 12     inhalational spacing device (AEROCHAMBER MV) Spcr Use with albuterol inhaler 1 each 0     No current facility-administered medications on file prior to visit.           No results found for this or any previous visit (from the past 48 hour(s)).      Assessment/Plan:  1. Essential hypertension  Will increase hctz to 50mg po daily.    Recheck in 2 weeks.    - hydroCHLOROthiazide (HYDRODIURIL) 25 MG tablet; Take 2 tablets " (50 mg total) by mouth daily.  Dispense: 120 tablet; Refill: 12          Mandy Corrigan   3/11/2019 9:56 AM

## 2021-12-31 ENCOUNTER — IMMUNIZATION (OUTPATIENT)
Dept: NURSING | Facility: CLINIC | Age: 56
End: 2021-12-31
Payer: COMMERCIAL

## 2021-12-31 PROCEDURE — 91300 PR COVID VAC PFIZER DIL RECON 30 MCG/0.3 ML IM: CPT

## 2021-12-31 PROCEDURE — 0004A PR COVID VAC PFIZER DIL RECON 30 MCG/0.3 ML IM: CPT

## 2022-02-01 ENCOUNTER — LAB (OUTPATIENT)
Dept: LAB | Facility: CLINIC | Age: 57
End: 2022-02-01
Payer: COMMERCIAL

## 2022-02-01 DIAGNOSIS — Z20.822 CLOSE EXPOSURE TO 2019 NOVEL CORONAVIRUS: ICD-10-CM

## 2022-02-01 PROCEDURE — U0005 INFEC AGEN DETEC AMPLI PROBE: HCPCS

## 2022-02-01 PROCEDURE — U0003 INFECTIOUS AGENT DETECTION BY NUCLEIC ACID (DNA OR RNA); SEVERE ACUTE RESPIRATORY SYNDROME CORONAVIRUS 2 (SARS-COV-2) (CORONAVIRUS DISEASE [COVID-19]), AMPLIFIED PROBE TECHNIQUE, MAKING USE OF HIGH THROUGHPUT TECHNOLOGIES AS DESCRIBED BY CMS-2020-01-R: HCPCS

## 2022-02-02 LAB — SARS-COV-2 RNA RESP QL NAA+PROBE: NEGATIVE

## 2023-11-09 DIAGNOSIS — Z12.11 COLON CANCER SCREENING: ICD-10-CM

## 2023-11-23 ENCOUNTER — LAB (OUTPATIENT)
Dept: FAMILY MEDICINE | Facility: CLINIC | Age: 58
End: 2023-11-23
Payer: COMMERCIAL

## 2023-11-23 DIAGNOSIS — Z12.11 COLON CANCER SCREENING: ICD-10-CM

## 2025-01-24 ENCOUNTER — LAB REQUISITION (OUTPATIENT)
Dept: LAB | Facility: CLINIC | Age: 60
End: 2025-01-24
Payer: COMMERCIAL

## 2025-01-24 DIAGNOSIS — R50.9 FEVER, UNSPECIFIED: ICD-10-CM

## 2025-01-24 PROCEDURE — 87081 CULTURE SCREEN ONLY: CPT | Mod: ORL | Performed by: FAMILY MEDICINE

## 2025-01-26 LAB — BACTERIA SPEC CULT: NORMAL

## 2025-01-31 ENCOUNTER — LAB REQUISITION (OUTPATIENT)
Dept: LAB | Facility: CLINIC | Age: 60
End: 2025-01-31
Payer: COMMERCIAL

## 2025-01-31 DIAGNOSIS — R03.0 ELEVATED BLOOD-PRESSURE READING, WITHOUT DIAGNOSIS OF HYPERTENSION: ICD-10-CM

## 2025-01-31 LAB
ALBUMIN SERPL BCG-MCNC: 4 G/DL (ref 3.5–5.2)
ALP SERPL-CCNC: 84 U/L (ref 40–150)
ALT SERPL W P-5'-P-CCNC: 28 U/L (ref 0–50)
ANION GAP SERPL CALCULATED.3IONS-SCNC: 13 MMOL/L (ref 7–15)
AST SERPL W P-5'-P-CCNC: 30 U/L (ref 0–45)
BASOPHILS # BLD AUTO: 0.1 10E3/UL (ref 0–0.2)
BASOPHILS NFR BLD AUTO: 1 %
BILIRUB SERPL-MCNC: 0.5 MG/DL
BUN SERPL-MCNC: 15.3 MG/DL (ref 8–23)
CALCIUM SERPL-MCNC: 9.6 MG/DL (ref 8.8–10.4)
CHLORIDE SERPL-SCNC: 105 MMOL/L (ref 98–107)
CHOLEST SERPL-MCNC: 189 MG/DL
CREAT SERPL-MCNC: 0.55 MG/DL (ref 0.51–0.95)
EGFRCR SERPLBLD CKD-EPI 2021: >90 ML/MIN/1.73M2
EOSINOPHIL # BLD AUTO: 0.1 10E3/UL (ref 0–0.7)
EOSINOPHIL NFR BLD AUTO: 2 %
ERYTHROCYTE [DISTWIDTH] IN BLOOD BY AUTOMATED COUNT: 13.2 % (ref 10–15)
FASTING STATUS PATIENT QL REPORTED: ABNORMAL
FASTING STATUS PATIENT QL REPORTED: ABNORMAL
GLUCOSE SERPL-MCNC: 116 MG/DL (ref 70–99)
HCO3 SERPL-SCNC: 25 MMOL/L (ref 22–29)
HCT VFR BLD AUTO: 42.8 % (ref 35–47)
HDLC SERPL-MCNC: 54 MG/DL
HGB BLD-MCNC: 14.1 G/DL (ref 11.7–15.7)
IMM GRANULOCYTES # BLD: 0 10E3/UL
IMM GRANULOCYTES NFR BLD: 0 %
LDLC SERPL CALC-MCNC: 105 MG/DL
LYMPHOCYTES # BLD AUTO: 1.9 10E3/UL (ref 0.8–5.3)
LYMPHOCYTES NFR BLD AUTO: 34 %
MCH RBC QN AUTO: 28.1 PG (ref 26.5–33)
MCHC RBC AUTO-ENTMCNC: 32.9 G/DL (ref 31.5–36.5)
MCV RBC AUTO: 85 FL (ref 78–100)
MONOCYTES # BLD AUTO: 0.5 10E3/UL (ref 0–1.3)
MONOCYTES NFR BLD AUTO: 9 %
NEUTROPHILS # BLD AUTO: 3 10E3/UL (ref 1.6–8.3)
NEUTROPHILS NFR BLD AUTO: 53 %
NONHDLC SERPL-MCNC: 135 MG/DL
NRBC # BLD AUTO: 0 10E3/UL
NRBC BLD AUTO-RTO: 0 /100
PLATELET # BLD AUTO: 286 10E3/UL (ref 150–450)
POTASSIUM SERPL-SCNC: 3.6 MMOL/L (ref 3.4–5.3)
PROT SERPL-MCNC: 8 G/DL (ref 6.4–8.3)
RBC # BLD AUTO: 5.01 10E6/UL (ref 3.8–5.2)
SODIUM SERPL-SCNC: 143 MMOL/L (ref 135–145)
TRIGL SERPL-MCNC: 150 MG/DL
WBC # BLD AUTO: 5.7 10E3/UL (ref 4–11)

## 2025-01-31 PROCEDURE — 80061 LIPID PANEL: CPT | Mod: ORL | Performed by: FAMILY MEDICINE

## 2025-01-31 PROCEDURE — 85025 COMPLETE CBC W/AUTO DIFF WBC: CPT | Mod: ORL | Performed by: FAMILY MEDICINE

## 2025-01-31 PROCEDURE — 80053 COMPREHEN METABOLIC PANEL: CPT | Mod: ORL | Performed by: FAMILY MEDICINE

## 2025-03-17 ENCOUNTER — LAB REQUISITION (OUTPATIENT)
Dept: LAB | Facility: CLINIC | Age: 60
End: 2025-03-17
Payer: COMMERCIAL

## 2025-03-17 DIAGNOSIS — I10 ESSENTIAL (PRIMARY) HYPERTENSION: ICD-10-CM

## 2025-03-17 PROCEDURE — 80048 BASIC METABOLIC PNL TOTAL CA: CPT | Mod: ORL | Performed by: FAMILY MEDICINE

## 2025-03-18 LAB
ANION GAP SERPL CALCULATED.3IONS-SCNC: 15 MMOL/L (ref 7–15)
BUN SERPL-MCNC: 15 MG/DL (ref 8–23)
CALCIUM SERPL-MCNC: 9.2 MG/DL (ref 8.8–10.4)
CHLORIDE SERPL-SCNC: 102 MMOL/L (ref 98–107)
CREAT SERPL-MCNC: 0.53 MG/DL (ref 0.51–0.95)
EGFRCR SERPLBLD CKD-EPI 2021: >90 ML/MIN/1.73M2
GLUCOSE SERPL-MCNC: 119 MG/DL (ref 70–99)
HCO3 SERPL-SCNC: 25 MMOL/L (ref 22–29)
POTASSIUM SERPL-SCNC: 3.7 MMOL/L (ref 3.4–5.3)
SODIUM SERPL-SCNC: 142 MMOL/L (ref 135–145)